# Patient Record
Sex: FEMALE | Race: OTHER | HISPANIC OR LATINO | URBAN - METROPOLITAN AREA
[De-identification: names, ages, dates, MRNs, and addresses within clinical notes are randomized per-mention and may not be internally consistent; named-entity substitution may affect disease eponyms.]

---
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
## 2022-01-26 NOTE — ED STATDOCS - CROS ED CARDIOVAS ALL NEG
negative - no chest pain Orbicularis Oris Muscle Flap Text: The defect edges were debeveled with a #15 scalpel blade.  Given that the defect affected the competency of the oral sphincter an orbicularis oris muscle flap was deemed most appropriate to restore this competency and normal muscle function.  Using a sterile surgical marker, an appropriate flap was drawn incorporating the defect. The area thus outlined was incised with a #15 scalpel blade.

## 2022-12-19 ENCOUNTER — NON-APPOINTMENT (OUTPATIENT)
Age: 22
End: 2022-12-19

## 2023-05-02 ENCOUNTER — OFFICE (OUTPATIENT)
Dept: URBAN - METROPOLITAN AREA CLINIC 6 | Facility: CLINIC | Age: 23
Setting detail: OPHTHALMOLOGY
End: 2023-05-02
Payer: COMMERCIAL

## 2023-05-02 DIAGNOSIS — H01.001: ICD-10-CM

## 2023-05-02 DIAGNOSIS — H52.13: ICD-10-CM

## 2023-05-02 DIAGNOSIS — H01.004: ICD-10-CM

## 2023-05-02 PROBLEM — H52.7 REFRACTIVE ERROR: Status: ACTIVE | Noted: 2023-05-02

## 2023-05-02 PROCEDURE — 92015 DETERMINE REFRACTIVE STATE: CPT | Performed by: OPHTHALMOLOGY

## 2023-05-02 PROCEDURE — 92004 COMPRE OPH EXAM NEW PT 1/>: CPT | Performed by: OPHTHALMOLOGY

## 2023-05-02 ASSESSMENT — SPHEQUIV_DERIVED
OS_SPHEQUIV: -1.875
OS_SPHEQUIV: -1.75
OS_SPHEQUIV: -1.875
OD_SPHEQUIV: -1.875
OD_SPHEQUIV: -1.75
OD_SPHEQUIV: -1.875

## 2023-05-02 ASSESSMENT — REFRACTION_AUTOREFRACTION
OD_SPHERE: -1.50
OD_AXIS: 168
OD_CYLINDER: -0.50
OS_SPHERE: -1.00
OS_AXIS: 002
OS_CYLINDER: -1.50

## 2023-05-02 ASSESSMENT — VISUAL ACUITY
OD_BCVA: 20/60-1
OS_BCVA: 20/60-

## 2023-05-02 ASSESSMENT — REFRACTION_MANIFEST
OS_CYLINDER: -1.25
OD_SPHERE: -1.75
OU_VA: 20/20
OS_SPHERE: -1.25
OS_VA1: 20/20-1
OS_CYLINDER: -1.25
OD_CYLINDER: -0.25
OD_SPHERE: -1.75
OD_CYLINDER: -0.25
OS_AXIS: 002
OU_VA: 20/20
OS_AXIS: 002
OD_AXIS: 170
OS_VA1: 20/20-1
OD_AXIS: 170
OD_VA1: 20/20
OD_VA1: 20/20
OS_SPHERE: -1.25

## 2023-05-02 ASSESSMENT — TONOMETRY
OD_IOP_MMHG: 18
OS_IOP_MMHG: 18

## 2023-05-02 ASSESSMENT — AXIALLENGTH_DERIVED
OS_AL: 23.2681
OD_AL: 23.2235
OS_AL: 23.3156
OD_AL: 23.2708
OS_AL: 23.3156
OD_AL: 23.2708

## 2023-05-02 ASSESSMENT — KERATOMETRY
OD_K1POWER_DIOPTERS: 45.75
OS_K2POWER_DIOPTERS: 47.00
OD_AXISANGLE_DEGREES: 079
OS_AXISANGLE_DEGREES: 093
OS_K1POWER_DIOPTERS: 45.50
OD_K2POWER_DIOPTERS: 47.00

## 2023-05-02 ASSESSMENT — CONFRONTATIONAL VISUAL FIELD TEST (CVF)
OD_FINDINGS: FULL
OS_FINDINGS: FULL

## 2023-05-02 ASSESSMENT — LID EXAM ASSESSMENTS
OD_BLEPHARITIS: RUL
OS_BLEPHARITIS: LUL

## 2023-06-15 ENCOUNTER — NON-APPOINTMENT (OUTPATIENT)
Age: 23
End: 2023-06-15

## 2023-09-01 ENCOUNTER — NON-APPOINTMENT (OUTPATIENT)
Age: 23
End: 2023-09-01

## 2023-12-12 NOTE — ED PEDIATRIC NURSE NOTE - NSFALLRSKASSESSDT_ED_ALL_ED
A refill request has been started for Skyrizi Pen 150mg/ml from [facility] on [address] in [place], [place]     Rudi Song on 12/12/2023 at 11:09 AM    
Risankizumab-rzaa (SKYRIZI) 150 MG/ML subcutaneous   Last Written Prescription Date:  6/30/2023  Last Fill Quantity: 1,   # refills: 3  Last Office Visit : 6/30/2023  Future Office visit:  6/28/2024    Routing refill request to provider for review/approval because:  Drug not on the [facility], [facility] or [facility] refill protocol or controlled substance    Gogo Hale RN  Central Triage Red Flags/Med Refills      
24-May-2019 10:30

## 2024-05-01 ENCOUNTER — NON-APPOINTMENT (OUTPATIENT)
Age: 24
End: 2024-05-01

## 2024-06-13 ENCOUNTER — EMERGENCY (EMERGENCY)
Facility: HOSPITAL | Age: 24
LOS: 0 days | Discharge: HOME | End: 2024-06-13
Attending: EMERGENCY MEDICINE
Payer: MEDICAID

## 2024-06-13 VITALS
DIASTOLIC BLOOD PRESSURE: 74 MMHG | WEIGHT: 161.38 LBS | OXYGEN SATURATION: 98 % | SYSTOLIC BLOOD PRESSURE: 129 MMHG | HEART RATE: 76 BPM | RESPIRATION RATE: 12 BRPM | TEMPERATURE: 97 F

## 2024-06-13 VITALS
OXYGEN SATURATION: 99 % | RESPIRATION RATE: 16 BRPM | HEART RATE: 70 BPM | DIASTOLIC BLOOD PRESSURE: 54 MMHG | SYSTOLIC BLOOD PRESSURE: 116 MMHG

## 2024-06-13 DIAGNOSIS — Z20.2 CONTACT WITH AND (SUSPECTED) EXPOSURE TO INFECTIONS WITH A PREDOMINANTLY SEXUAL MODE OF TRANSMISSION: ICD-10-CM

## 2024-06-13 DIAGNOSIS — N30.90 CYSTITIS, UNSPECIFIED WITHOUT HEMATURIA: ICD-10-CM

## 2024-06-13 DIAGNOSIS — R82.998 OTHER ABNORMAL FINDINGS IN URINE: ICD-10-CM

## 2024-06-13 LAB
APPEARANCE UR: CLEAR — SIGNIFICANT CHANGE UP
BILIRUB UR-MCNC: NEGATIVE — SIGNIFICANT CHANGE UP
COLOR SPEC: YELLOW — SIGNIFICANT CHANGE UP
DIFF PNL FLD: NEGATIVE — SIGNIFICANT CHANGE UP
GLUCOSE UR QL: NEGATIVE MG/DL — SIGNIFICANT CHANGE UP
HIV 1 & 2 AB SERPL IA.RAPID: SIGNIFICANT CHANGE UP
KETONES UR-MCNC: NEGATIVE MG/DL — SIGNIFICANT CHANGE UP
LEUKOCYTE ESTERASE UR-ACNC: ABNORMAL
NITRITE UR-MCNC: POSITIVE
PH UR: 6.5 — SIGNIFICANT CHANGE UP (ref 5–8)
POCT URINE PREGNANCY TEST: NEGATIVE — SIGNIFICANT CHANGE UP
PROT UR-MCNC: NEGATIVE MG/DL — SIGNIFICANT CHANGE UP
SP GR SPEC: 1.02 — SIGNIFICANT CHANGE UP (ref 1–1.03)
UROBILINOGEN FLD QL: 0.2 MG/DL — SIGNIFICANT CHANGE UP (ref 0.2–1)

## 2024-06-13 PROCEDURE — 86780 TREPONEMA PALLIDUM: CPT

## 2024-06-13 PROCEDURE — 36415 COLL VENOUS BLD VENIPUNCTURE: CPT

## 2024-06-13 PROCEDURE — 99284 EMERGENCY DEPT VISIT MOD MDM: CPT

## 2024-06-13 PROCEDURE — 81025 URINE PREGNANCY TEST: CPT

## 2024-06-13 PROCEDURE — 99283 EMERGENCY DEPT VISIT LOW MDM: CPT | Mod: 25

## 2024-06-13 PROCEDURE — 87086 URINE CULTURE/COLONY COUNT: CPT

## 2024-06-13 PROCEDURE — 87591 N.GONORRHOEAE DNA AMP PROB: CPT

## 2024-06-13 PROCEDURE — 86803 HEPATITIS C AB TEST: CPT

## 2024-06-13 PROCEDURE — 81001 URINALYSIS AUTO W/SCOPE: CPT

## 2024-06-13 PROCEDURE — 86703 HIV-1/HIV-2 1 RESULT ANTBDY: CPT

## 2024-06-13 PROCEDURE — 87491 CHLMYD TRACH DNA AMP PROBE: CPT

## 2024-06-13 PROCEDURE — 96372 THER/PROPH/DIAG INJ SC/IM: CPT

## 2024-06-13 RX ORDER — NITROFURANTOIN MACROCRYSTAL 50 MG
100 CAPSULE ORAL ONCE
Refills: 0 | Status: COMPLETED | OUTPATIENT
Start: 2024-06-13 | End: 2024-06-13

## 2024-06-13 RX ORDER — AZITHROMYCIN 500 MG/1
1000 TABLET, FILM COATED ORAL ONCE
Refills: 0 | Status: COMPLETED | OUTPATIENT
Start: 2024-06-13 | End: 2024-06-13

## 2024-06-13 RX ORDER — NITROFURANTOIN MACROCRYSTAL 50 MG
1 CAPSULE ORAL
Qty: 10 | Refills: 0
Start: 2024-06-13 | End: 2024-06-17

## 2024-06-13 RX ORDER — CEFTRIAXONE 500 MG/1
500 INJECTION, POWDER, FOR SOLUTION INTRAMUSCULAR; INTRAVENOUS ONCE
Refills: 0 | Status: COMPLETED | OUTPATIENT
Start: 2024-06-13 | End: 2024-06-13

## 2024-06-13 RX ADMIN — Medication 100 MILLIGRAM(S): at 15:08

## 2024-06-13 RX ADMIN — AZITHROMYCIN 1000 MILLIGRAM(S): 500 TABLET, FILM COATED ORAL at 15:07

## 2024-06-13 RX ADMIN — CEFTRIAXONE 500 MILLIGRAM(S): 500 INJECTION, POWDER, FOR SOLUTION INTRAMUSCULAR; INTRAVENOUS at 15:06

## 2024-06-13 NOTE — ED STATDOCS - NSFOLLOWUPINSTRUCTIONS_ED_ALL_ED_FT
Looks like thse were filled 3 weeks ago by Dr. Sabiha Jones. Please follow up with your primary care doctor within 1 week. Bring copies of your results with you (provided in your discharge paperwork). Please stay well-hydrated.    You may take 500-1000 mg acetaminophen (tylenol) every 6 hours, as needed for pain.  You may take 600 mg ibuprofen every 8 hours, with food, as needed for pain.  You can take tylenol and ibuprofen at the same time.     Chlamydia is a sexually transmitted infection (STI). This infection spreads through sexual contact. In some cases, there are no symptoms, especially early in the illness.    If you get symptoms, they may include:  Peeing often, or a burning feeling when you pee.  Redness, soreness, or swelling of the vagina or butt.  Fluid (discharge) coming from the vagina or butt.  Pain the belly (abdomen).  Pain during sex.  Bleeding between monthly periods or irregular periods.    How is this prevented?    To lower your risk:  Use latex or polyurethane condoms the right way. Do this every time you have sex.  Do not have many sex partners.  Ask if your sex partner got tested for STIs and had negative results.  Get regular health screenings to check for STIs.    Contact a doctor if:  You get new symptoms.  Your symptoms are getting worse or do not get better with treatment.  You have a fever or chills.  You have pain during sex.  Your periods are irregular.  You bleed between periods or after sex.  You get flu-like symptoms. These may be:  Night sweats.  Sore throat.  Muscle aches.    Where to find more information  Learn more about STIs from:  Centers for Disease Control and Prevention (CDC):  More information about certain STIs: cdc.gov  Places to get sexual health counseling and treatment for free or at a low cost: gettested.cdc.gov  [place].S[place] Department of Health and Human Services (HHS): womenshealth.gov Please follow up with your primary care doctor within 1 week. Bring copies of your results with you (provided in your discharge paperwork). Please stay well-hydrated.    You may take 500-1000 mg acetaminophen (tylenol) every 6 hours, as needed for pain.  You may take 600 mg ibuprofen every 8 hours, with food, as needed for pain.  You can take tylenol and ibuprofen at the same time.     You also also being treated for a urinary tract infection. You have been prescribed an antibiotic medication (macrobid) - please take as prescribed. Please completely finish this medication even if you begin to feel better.     A urinary tract infection (UTI) is an infection of any part of the urinary tract, which includes the kidneys, ureters, bladder, and urethra. Risk factors include ignoring your need to urinate, wiping back to front if female, being an uncircumcised male, and having diabetes or a weak immune system. Symptoms include frequent urination, pain or burning with urination, foul smelling urine, cloudy urine, pain in the lower abdomen, blood in the urine, and fever. If you were prescribed an antibiotic medicine, take it as told by your health care provider. Do not stop taking the antibiotic even if you start to feel better.    SEEK IMMEDIATE MEDICAL CARE IF YOU HAVE ANY OF THE FOLLOWING SYMPTOMS: severe back or abdominal pain, fever, inability to keep fluids or medicine down, dizziness/lightheadedness, or a change in mental status.    Chlamydia is a sexually transmitted infection (STI). This infection spreads through sexual contact. In some cases, there are no symptoms, especially early in the illness.    If you get symptoms, they may include:  Peeing often, or a burning feeling when you pee.  Redness, soreness, or swelling of the vagina or butt.  Fluid (discharge) coming from the vagina or butt.  Pain the belly (abdomen).  Pain during sex.  Bleeding between monthly periods or irregular periods.    How is this prevented?    To lower your risk:  Use latex or polyurethane condoms the right way. Do this every time you have sex.  Do not have many sex partners.  Ask if your sex partner got tested for STIs and had negative results.  Get regular health screenings to check for STIs.    Contact a doctor if:  You get new symptoms.  Your symptoms are getting worse or do not get better with treatment.  You have a fever or chills.  You have pain during sex.  Your periods are irregular.  You bleed between periods or after sex.  You get flu-like symptoms. These may be:  Night sweats.  Sore throat.  Muscle aches.    Where to find more information  Learn more about STIs from:  Centers for Disease Control and Prevention (CDC):  More information about certain STIs: cdc.gov  Places to get sexual health counseling and treatment for free or at a low cost: gettested.cdc.gov  U.S. Department of Health and Human Services (HHS): womenshealth.gov Please follow up with your primary care doctor within 1 week for a comprehensive evaluation of your health. Bring copies of your results with you (provided in your discharge paperwork). Please stay well-hydrated.    You are being treated for a urinary tract infection. You have been prescribed an antibiotic medication (macrobid) - please take as prescribed, twice per day for 5 days. You were given the first dose in the Emergency Department. Please completely finish this medication even if you begin to feel better.     You may take 500-1000 mg acetaminophen (tylenol) every 6 hours, as needed for pain.  You may take 600 mg ibuprofen every 8 hours, with food, as needed for pain.  You can take tylenol and ibuprofen at the same time.     A urinary tract infection (UTI) is an infection of any part of the urinary tract, which includes the kidneys, ureters, bladder, and urethra. Risk factors include ignoring your need to urinate, wiping back to front if female, being an uncircumcised male, and having diabetes or a weak immune system. Symptoms include frequent urination, pain or burning with urination, foul smelling urine, cloudy urine, pain in the lower abdomen, blood in the urine, and fever. If you were prescribed an antibiotic medicine, take it as told by your health care provider. Do not stop taking the antibiotic even if you start to feel better.    SEEK IMMEDIATE MEDICAL CARE IF YOU HAVE ANY OF THE FOLLOWING SYMPTOMS: severe back or abdominal pain, fever, inability to keep fluids or medicine down, dizziness/lightheadedness, or a change in mental status.    Chlamydia is a sexually transmitted infection (STI). This infection spreads through sexual contact. In some cases, there are no symptoms, especially early in the illness.    If you get symptoms, they may include:  Peeing often, or a burning feeling when you pee.  Redness, soreness, or swelling of the vagina or butt.  Fluid (discharge) coming from the vagina or butt.  Pain the belly (abdomen).  Pain during sex.  Bleeding between monthly periods or irregular periods.    How is this prevented?    To lower your risk:  Use latex or polyurethane condoms the right way. Do this every time you have sex.  Do not have many sex partners.  Ask if your sex partner got tested for STIs and had negative results.  Get regular health screenings to check for STIs.    Contact a doctor if:  You get new symptoms.  Your symptoms are getting worse or do not get better with treatment.  You have a fever or chills.  You have pain during sex.  Your periods are irregular.  You bleed between periods or after sex.  You get flu-like symptoms. These may be:  Night sweats.  Sore throat.  Muscle aches.    Where to find more information  Learn more about STIs from:  Centers for Disease Control and Prevention (CDC):  More information about certain STIs: cdc.gov  Places to get sexual health counseling and treatment for free or at a low cost: gettested.cdc.gov  U.S. Department of Health and Human Services (HHS): womenshealth.gov

## 2024-06-13 NOTE — ED ADULT NURSE NOTE - CAS EDN DISCHARGE ASSESSMENT
normal appearance, no deformities, trachea midline, thyroid nontender
Alert and oriented to person, place and time

## 2024-06-13 NOTE — ED STATDOCS - PROGRESS NOTE DETAILS
Pt reports UTI symptoms 2 weeks ago treated with AZO and cranberry juice; UA notable for nitrites and WBC > 10, will treat for uncomplicated cystitis with macrobid. - Barbara Torres, PGY-3

## 2024-06-13 NOTE — ED STATDOCS - PATIENT PORTAL LINK FT
You can access the FollowMyHealth Patient Portal offered by [facility] by registering at the following website: [email or web address]. By joining [facility]’s FollowMyHealth portal, you will also be able to view your health information using other applications (apps) compatible with our system.

## 2024-06-13 NOTE — ED ADULT NURSE NOTE - CAS ELECT INFOMATION PROVIDED
Is This A New Presentation, Or A Follow-Up?: Skin Lesion
What Type Of Note Output Would You Prefer (Optional)?: Bullet Format
How Severe Is Your Skin Lesion?: moderate
Has Your Skin Lesion Been Treated?: not been treated
Additional History: Patient has veins on her left lower leg
DC instructions

## 2024-06-13 NOTE — ED STATDOCS - NS ED ROS FT
SEE HPI    All other ROS negative unless otherwise specified in HPI.     ~Barbara Torres M.D. Resident

## 2024-06-13 NOTE — ED STATDOCS - CLINICAL SUMMARY MEDICAL DECISION MAKING FREE TEXT BOX
23yoF no PMHx p/w STI exposure, asymptomatic. Will check urine GC/chlam, RPR given increasing prevalence, HIV/hep C, treat empirically for GC/chlam, and DCTH w/PCP follow up. Pt offered pain meds and declined on initial assessment. - Barbara Torres, PGY-3

## 2024-06-13 NOTE — ED STATDOCS - OBJECTIVE STATEMENT
23yoF no PMHx p/w STI exposure. Pt was informed by sexual partner that they contracted chlamydia a few days ago; pt concerned for possible exposure so she came to ER for evaluation. No symptoms, pt denies dysuria, hematuria, pelvic pain, nausea/vomiting, rashes, lesions/blisters/ulcerations, abnormal vaginal discharge/bleeding, fevers, ROS otherwise negative. No Hx of STIs.

## 2024-06-14 LAB
C TRACH RRNA SPEC QL NAA+PROBE: SIGNIFICANT CHANGE UP
CULTURE RESULTS: SIGNIFICANT CHANGE UP
HCV AB S/CO SERPL IA: 0.1 S/CO — SIGNIFICANT CHANGE UP (ref 0–0.99)
HCV AB SERPL-IMP: SIGNIFICANT CHANGE UP
N GONORRHOEA RRNA SPEC QL NAA+PROBE: SIGNIFICANT CHANGE UP
SPECIMEN SOURCE: SIGNIFICANT CHANGE UP
SPECIMEN SOURCE: SIGNIFICANT CHANGE UP
T PALLIDUM AB TITR SER: NEGATIVE — SIGNIFICANT CHANGE UP

## 2025-01-22 NOTE — ED PEDIATRIC NURSE NOTE - NSFALLRSKOUTCOME_ED_ALL_ED
Future Appointments   Date Time Provider Department Center   1/28/2025  7:30 AM Trung Ruano MD [facility] [address] [facility] [address]        Universal Safety Interventions

## 2025-06-30 ENCOUNTER — RESULT REVIEW (OUTPATIENT)
Age: 25
End: 2025-06-30

## 2025-07-11 ENCOUNTER — NON-APPOINTMENT (OUTPATIENT)
Age: 25
End: 2025-07-11

## 2025-08-07 ENCOUNTER — EMERGENCY (EMERGENCY)
Facility: HOSPITAL | Age: 25
LOS: 0 days | Discharge: LEFT BEFORE DISCHARGE | End: 2025-08-07
Attending: EMERGENCY MEDICINE
Payer: MEDICAID

## 2025-08-07 VITALS
RESPIRATION RATE: 18 BRPM | TEMPERATURE: 98 F | WEIGHT: 157.63 LBS | DIASTOLIC BLOOD PRESSURE: 68 MMHG | HEART RATE: 81 BPM | SYSTOLIC BLOOD PRESSURE: 118 MMHG | OXYGEN SATURATION: 99 %

## 2025-08-07 DIAGNOSIS — Z53.21 PROCEDURE AND TREATMENT NOT CARRIED OUT DUE TO PATIENT LEAVING PRIOR TO BEING SEEN BY HEALTH CARE PROVIDER: ICD-10-CM

## 2025-08-07 DIAGNOSIS — R19.7 DIARRHEA, UNSPECIFIED: ICD-10-CM

## 2025-08-07 PROCEDURE — L9991: CPT
